# Patient Record
Sex: FEMALE | Race: OTHER
[De-identification: names, ages, dates, MRNs, and addresses within clinical notes are randomized per-mention and may not be internally consistent; named-entity substitution may affect disease eponyms.]

---

## 2022-01-22 ENCOUNTER — HOSPITAL ENCOUNTER (EMERGENCY)
Dept: HOSPITAL 61 - ER | Age: 52
Discharge: HOME | End: 2022-01-22
Payer: COMMERCIAL

## 2022-01-22 VITALS — HEIGHT: 63 IN | WEIGHT: 121.47 LBS | BODY MASS INDEX: 21.52 KG/M2

## 2022-01-22 VITALS — SYSTOLIC BLOOD PRESSURE: 137 MMHG | DIASTOLIC BLOOD PRESSURE: 69 MMHG

## 2022-01-22 DIAGNOSIS — Z20.822: ICD-10-CM

## 2022-01-22 DIAGNOSIS — N39.0: Primary | ICD-10-CM

## 2022-01-22 LAB
APTT PPP: YELLOW S
BACTERIA #/AREA URNS HPF: (no result) /HPF
BILIRUB UR QL STRIP: NEGATIVE
FIBRINOGEN PPP-MCNC: (no result) MG/DL
INFLUENZA A PATIENT: NEGATIVE
INFLUENZA B PATIENT: NEGATIVE
NITRITE UR QL STRIP: POSITIVE
PH UR STRIP: 5 [PH]
PROT UR STRIP-MCNC: 100 MG/DL
RBC #/AREA URNS HPF: (no result) /HPF (ref 0–2)
UROBILINOGEN UR-MCNC: 0.2 MG/DL
WBC #/AREA URNS HPF: >40 /HPF (ref 0–4)

## 2022-01-22 PROCEDURE — 87428 SARSCOV & INF VIR A&B AG IA: CPT

## 2022-01-22 PROCEDURE — 87077 CULTURE AEROBIC IDENTIFY: CPT

## 2022-01-22 PROCEDURE — 99283 EMERGENCY DEPT VISIT LOW MDM: CPT

## 2022-01-22 PROCEDURE — 87086 URINE CULTURE/COLONY COUNT: CPT

## 2022-01-22 PROCEDURE — 81001 URINALYSIS AUTO W/SCOPE: CPT

## 2022-01-22 PROCEDURE — 87186 SC STD MICRODIL/AGAR DIL: CPT

## 2022-01-22 PROCEDURE — U0003 INFECTIOUS AGENT DETECTION BY NUCLEIC ACID (DNA OR RNA); SEVERE ACUTE RESPIRATORY SYNDROME CORONAVIRUS 2 (SARS-COV-2) (CORONAVIRUS DISEASE [COVID-19]), AMPLIFIED PROBE TECHNIQUE, MAKING USE OF HIGH THROUGHPUT TECHNOLOGIES AS DESCRIBED BY CMS-2020-01-R: HCPCS

## 2022-01-22 NOTE — PHYS DOC
Past Medical History


Past Surgical History:  No Surgical History


Smoking Status:  Never Smoker


Alcohol Use:  None


Drug Use:  None





Adult General


Chief Complaint


Chief Complaint:  MULTIPLE COMPLAINTS





HPI


HPI





Patient is a 51 year old female who presents with nausea, vomiting, low back 

pain, fever, fatigue and headache that began approximately 1 week ago.  Patient 

states that she arrived from [place] 8 days ago.  She finished ampicillin 

antibiotic treatment for UTI 10 days ago, but continues to have some discomfort 

on urination.  She is worried that her current symptoms may be due to continued 

UTI or possible other illness.  She states her symptoms now feel different than 

when she was originally diagnosed with UTI.  Patient reports she got vaccinated 

against COVID-19 in November of last year.  Shortly after, she became infected 

with COVID-19.  She denies abdominal pain, diarrhea, constipation, hematuria, 

focal weakness or paresthesias.  She has no other complaints at this time.





Patient is [place]-speaking.  Interview and exam conducted in [place].





Review of Systems


Review of Systems





Constitutional: See HPI


Eyes: Denies change in visual acuity, redness, or eye pain 


HENT: Denies nasal congestion or sore throat 


Respiratory: Denies cough or shortness of breath 


Cardiovascular: No additional information not addressed in HPI 


GI: See HPI


:  See HPI


Musculoskeletal: See HPI


Integument: Denies rash or skin lesions 


Neurologic: See HPI





All other systems were reviewed and found to be within normal limits, except as 

documented in this note.





Allergies


Allergies





Allergies








Coded Allergies Type Severity Reaction Last Updated Verified


 


  No Known Drug Allergies    1/22/22 No











Physical Exam


Physical Exam





Constitutional: Well developed, well nourished, no acute distress, non-toxic 

appearance. 


HENT: Normocephalic, atraumatic, bilateral external ears normal, nose normal. 


Eyes: EOMI, conjunctiva normal, no discharge.  


Neck: Normal range of motion, no stridor.  


Skin: Warm, dry, no erythema, no rash.  


Extremities: No tenderness, no cyanosis, no clubbing, ROM intact, no edema. 


Neurologic: Alert and oriented x4, steady and symmetrical gait, no focal 

deficits noted.





Current Patient Data


Vital Signs





                                   Vital Signs








  Date Time  Temp Pulse Resp B/P (MAP) Pulse Ox O2 Delivery O2 Flow Rate FiO2


 


1/22/22 12:51 99.1 97 16 137/69 (91) 97 Room Air  





 99.1       








Lab Values





                                Laboratory Tests








Test


 1/22/22


13:20


 


Urine Collection Type Unknown  


 


Urine Color Yellow  


 


Urine Clarity Cloudy  


 


Urine pH


 5.0 (<5.0-8.0)





 


Urine Specific Gravity


 1.025


(1.000-1.030)


 


Urine Protein


 100 mg/dL


(NEG-TRACE)


 


Urine Glucose (UA)


 >=1000 mg/dL


(NEG)


 


Urine Ketones (Stick)


 15 mg/dL (NEG)





 


Urine Blood Small (NEG)  


 


Urine Nitrite


 Positive (NEG)





 


Urine Bilirubin


 Negative (NEG)





 


Urine Urobilinogen Dipstick


 0.2 mg/dL (0.2


mg/dL)


 


Urine Leukocyte Esterase


 Moderate (NEG)





 


Urine RBC


 Occ /HPF (0-2)





 


Urine WBC


 >40 /HPF (0-4)





 


Urine Bacteria


 Many /HPF


(0-FEW)


 


Influenza Type A Antigen


 Negative


(NEGATIVE)


 


Influenza Type B Antigen


 Negative


(NEGATIVE)











Course & Med Decision Making


Course & Med Decision Making


Pertinent Labs and Imaging studies reviewed. (See chart for details)





Patient is a 51-year-old female who recently underwent treatment for UTI who 

presents with multiple symptoms concerning for viral illness versus persistent 

UTI, or possibly both.  COVID-19 infection cannot be excluded.  Work-up today 

will include urinalysis, swabs for influenza A&B, COVID-19.





Urinalysis does show evidence of UTI.  Antibiotic treatment with ampicillin 

failed.  Swabs for influenza A&B are negative.  Patient advised that COVID-19 

swab test results would not be available until tomorrow the next day.  Treatment

going forward will consist of supportive treatment and quarantine until COVID-19

test results become available.  Additionally, patient will be treated with 

Keflex for her UTI.  She was provided with return precautions.  Patient and her 

family member at bedside understand and are agreeable to discharge plan.





Dragon Disclaimer


Dragon Disclaimer


This electronic medical record was generated, in whole or in part, using a voice

recognition dictation system.





Departure


Departure


Impression:  


   Primary Impression:  


   UTI (urinary tract infection)


   Additional Impression:  


   Person under investigation for COVID-19


Disposition:  01 HOME / SELF CARE / HOMELESS


Condition:  STABLE


Patient Instructions:  Urinary Tract Infection, Easy-to-Read





Additional Instructions:  


Siga las siguientes medidas de tratamiento de apoyo:


 - Humidificador de vapor fro con agua corriente al lado de la cama mientras 

duerme


 - Mucinex (guaifenesina) segn instrucciones de la caja


 - Alternar ibuprofeno y paracetamol cada cuatro horas para tesha 

corporales/fiebre/dolor de dee





Si le recetaron antibiticos, tmelos segn las indicaciones.





Le gleason hecho darline prueba o le gleason diagnosticado darline infeccin por COVID-19. Es 

darline infeccin causada por un nuevo tipo de coronavirus. COVID-19 causar 

sntomas de gripe leves o similares a los de un resfriado en la mayora. Puede 

causar sntomas ms graves, jorge problemas para respirar en algunos. No hay 

tratamiento para el COVID-19. El cuerpo eliminar la infeccin con el tiempo. El

autocuidado ayudar a aliviar las molestias.





Pasos a seguir:


 - Descansar segn sea necesario.


 - Elija alimentos saludables que incluyan frutas y verduras. Yelena agua bia 

todo el da.


 - Duerma lo suficiente cada noche.


 - Si fuma, trate de dejarlo. Puede facilitar la respiracin.


 - Evite el alcohol.


 - Mantenga a los dems saludables


 - El virus puede propagarse a otros. Las gotas se liberan cada vez que 

estornuda o tose. Las gotas pueden entrar en la boca, la nariz o los ojos de las

personas cercanas a usted y provocar darline infeccin. Para reducir las 

posibilidades de propagar el COVID-19 a otras personas:





Qudese en casa hasta que arnold mdico le haya dicho que es seguro irse. Si juan 

positivo, esto significar permanecer aislado hasta que ambos de los siguientes 

christa ciertos:


 - Gleason pasado al menos 7 sy desde el inicio de la enfermedad.


 - Est vy de fiebre bia al menos 72 horas sin el uso de medicamentos.





Bia joan tiempo:


 - Evite las reas pblicas, los eventos o el transporte. No regrese al trabajo 

oa la escuela hasta que arnold mdico le haya dicho que es seguro hacerlo.


 - Llame con antelacin si necesita acudir a un centro mdico. Hgales saber que

puede tener COVID-19. Les ayudar a guiarlo a dnde ir. Ana pueden pedirle 

que use darline mscara facial cuando venga a la oficina.


 - Si llama a los servicios mdicos de emergencia, infrmeles que puede tener 

COVID-19.





Mientras est en casa:


 - Trate de evitar el contacto cercano con otras personas. Mantngase a unos 6 

pies de distancia.


 - Si es posible, pase la mayor parte de arnold tiempo en darline habitacin separada de

los dems.


 - Use darline mscara facial si estar en contacto cercano con otras personas, jorge

compartir darline habitacin o un vehculo.


 - Pdale a alguien que limpie las superficies comunes de la casa. Use 

limpiadores domsticos todos los sy en reas jorge manijas de sherice, 

mostradores o fregaderos.


 - Toser o estornudar en un pauelo desechable. Deseche el pauelo 

inmediatamente despus de usarlo. Si no hay un pauelo disponible, tosa o 

estornude en el codo.


 - Lavarse las kermit con frecuencia. Lvelos despus de estornudar o toser. Use 

agua y jabn y lave o por lo menos 20 segundos. Se puede usar un limpiador de 

kermit a base de alcohol si no hay agua y jabn disponibles.


 - No prepare comida para otros. Evite compartir artculos personales jorge 

tenedores, cucharas o cepillos de dientes.


 - Evite el contacto cercano con mascotas mientras est enfermo. No hay 

evidencia de que el virus pase a las mascotas. Joan es un paso de seguridad 

hasta que se sepa ms sobre joan virus.


 - El aislamiento puede ser frustrante. La interaccin social puede ayudar. 

Mantngase en contacto con amigos y familiares a travs del telfono y las 

opciones tecnolgicas. Todava puede interactuar con otras personas en arnold hogar,

solo mantenga darline distancia forrest de aproximadamente 6 pies.





Hacer un seguimiento:


 - El consultorio de arnold mdico se comunicar con usted para adonis si hay algn c

ambio en arnold adriane.


 - Es posible que se le pida que lleve un registro de los sntomas para 

compartir con ellos. Tambin le avisarn cuando tenga autorizacin para volver a

estar en pblico.





Comunquese con arnold mdico si arnold recuperacin no va jorge esperaba. Obtenga 

atencin de emergencia si tiene problemas jorge:


 - Dificultad para respirar con saturacin de oxgeno <90%


 - Dolor o presin en el pecho sin parar


 - Cambios en la conciencia, confusin o problemas para despertarse


 - Los labios o la jessica tienen un color azulado


 - Empeoramiento de los sntomas





Si andrew que tiene darline emergencia, llame a los servicios mdicos de emergencia de

inmediato.





[facility]


Scripts


Cephalexin (CEPHALEXIN) 500 Mg Tablet


1 TAB PO BID, #20 TAB


   Prov: BALA LEWIS         1/22/22





Problem Qualifiers








   Primary Impression:  


   UTI (urinary tract infection)


   Urinary tract infection type:  acute cystitis  Hematuria presence:  without 

   hematuria  Qualified Codes:  N30.00 - Acute cystitis without hematuria








BALA LEWIS              Jan 22, 2022 14:24